# Patient Record
Sex: MALE | URBAN - METROPOLITAN AREA
[De-identification: names, ages, dates, MRNs, and addresses within clinical notes are randomized per-mention and may not be internally consistent; named-entity substitution may affect disease eponyms.]

---

## 2024-01-03 ENCOUNTER — TELEPHONE ENCOUNTER (OUTPATIENT)
Dept: URBAN - METROPOLITAN AREA CLINIC 113 | Facility: CLINIC | Age: 59
End: 2024-01-03

## 2024-05-03 ENCOUNTER — OFFICE VISIT (OUTPATIENT)
Dept: URBAN - METROPOLITAN AREA CLINIC 113 | Facility: CLINIC | Age: 59
End: 2024-05-03

## 2024-06-14 ENCOUNTER — LAB OUTSIDE AN ENCOUNTER (OUTPATIENT)
Dept: URBAN - METROPOLITAN AREA CLINIC 113 | Facility: CLINIC | Age: 59
End: 2024-06-14

## 2024-06-14 ENCOUNTER — DASHBOARD ENCOUNTERS (OUTPATIENT)
Age: 59
End: 2024-06-14

## 2024-06-14 ENCOUNTER — OFFICE VISIT (OUTPATIENT)
Dept: URBAN - METROPOLITAN AREA CLINIC 113 | Facility: CLINIC | Age: 59
End: 2024-06-14
Payer: MEDICARE

## 2024-06-14 VITALS
HEART RATE: 68 BPM | WEIGHT: 250 LBS | BODY MASS INDEX: 35 KG/M2 | TEMPERATURE: 97.9 F | RESPIRATION RATE: 18 BRPM | HEIGHT: 71 IN | DIASTOLIC BLOOD PRESSURE: 78 MMHG | SYSTOLIC BLOOD PRESSURE: 132 MMHG

## 2024-06-14 DIAGNOSIS — Z12.11 COLON CANCER SCREENING: ICD-10-CM

## 2024-06-14 DIAGNOSIS — K76.0 FATTY LIVER: ICD-10-CM

## 2024-06-14 DIAGNOSIS — R74.8 ELEVATED LIPASE: ICD-10-CM

## 2024-06-14 PROBLEM — 197321007: Status: ACTIVE | Noted: 2024-06-14

## 2024-06-14 PROBLEM — 441957004: Status: ACTIVE | Noted: 2024-06-14

## 2024-06-14 PROBLEM — 305058001: Status: ACTIVE | Noted: 2024-06-14

## 2024-06-14 PROCEDURE — 99204 OFFICE O/P NEW MOD 45 MIN: CPT | Performed by: INTERNAL MEDICINE

## 2024-06-14 RX ORDER — ATORVASTATIN CALCIUM 40 MG/1
1 TABLET TABLET, FILM COATED ORAL ONCE A DAY
Status: ACTIVE | COMMUNITY

## 2024-06-14 RX ORDER — LEVOTHYROXINE SODIUM 75 UG/1
1 CAPSULE IN THE MORNING ON AN EMPTY STOMACH CAPSULE ORAL ONCE A DAY
Status: ACTIVE | COMMUNITY

## 2024-06-14 RX ORDER — NORTRIPTYLINE HYDROCHLORIDE 50 MG/1
1 CAPSULE CAPSULE ORAL ONCE A DAY
Status: ACTIVE | COMMUNITY

## 2024-06-14 NOTE — HPI-TODAY'S VISIT:
58-year-old man referred by Dr. Tessa Garsia for evaluation of acute pancreatitis.  Referral was sent in December 2023. Patient has a history of obesity, depression, chronic pain, CVA, COPD.  Patient reportedly smokes marijuana. Patient presented in December 2023 to Dr. Garsia with nausea, LUQ pain, and reported weight loss.  H. pylori was negative.  Lipase was mildly elevated to 83.  BUN 14, creatinine 0.9, total bilirubin 0.6, ALT 51, AST 31, alkaline phosphatase 80 Patient was subsequently diagnosed with acute pancreatitis, presumably on the basis of mild elevation in lipase and was referred for further evaluation. Of note, patient was referred in June 2023 for colorectal cancer screening but was not scheduled. Patient had CT scan in Nov 2023.   Currently no abd pain, weight loss, has cut down on beer consumption (now down to 3/day), tries to adhere to low fat diet (has eggs, toast, grits for breakfast, vegetables, grilled meats and fish with vegetables for lunch/dinner). normal BM.  no blood in stool.  Occasional bowel irregularity.  Cologuard years ago.

## 2024-06-14 NOTE — PHYSICAL EXAM GASTROINTESTINAL
Abdomen softly distended, nontender, no masses palpable , normal bowel sounds, no hepatosplenomegaly

## 2024-06-15 LAB
A/G RATIO: 2
ABSOLUTE BASOPHILS: 30
ABSOLUTE EOSINOPHILS: 473
ABSOLUTE LYMPHOCYTES: 1530
ABSOLUTE MONOCYTES: 375
ABSOLUTE NEUTROPHILS: 5093
ALBUMIN: 4.3
ALKALINE PHOSPHATASE: 70
ALT (SGPT): 17
AST (SGOT): 15
BASOPHILS: 0.4
BILIRUBIN, TOTAL: 0.3
BUN/CREATININE RATIO: (no result)
BUN: 16
CALCIUM: 9.4
CARBON DIOXIDE, TOTAL: 26
CHLORIDE: 105
CREATININE: 0.78
EGFR: 103
EOSINOPHILS: 6.3
GLOBULIN, TOTAL: 2.2
GLUCOSE: 116
HEMATOCRIT: 45.5
HEMOGLOBIN: 14.4
INR: 1
LIPASE: 17
LYMPHOCYTES: 20.4
MCH: 29
MCHC: 31.6
MCV: 91.7
MONOCYTES: 5
MPV: 12.3
NEUTROPHILS: 67.9
PLATELET COUNT: 130
POTASSIUM: 4.2
PROTEIN, TOTAL: 6.5
PT: 10.3
RDW: 14.1
RED BLOOD CELL COUNT: 4.96
SODIUM: 139
WHITE BLOOD CELL COUNT: 7.5

## 2024-06-17 ENCOUNTER — TELEPHONE ENCOUNTER (OUTPATIENT)
Dept: URBAN - METROPOLITAN AREA CLINIC 113 | Facility: CLINIC | Age: 59
End: 2024-06-17

## 2024-07-23 ENCOUNTER — LAB OUTSIDE AN ENCOUNTER (OUTPATIENT)
Dept: URBAN - METROPOLITAN AREA CLINIC 113 | Facility: CLINIC | Age: 59
End: 2024-07-23

## 2024-07-23 ENCOUNTER — TELEPHONE ENCOUNTER (OUTPATIENT)
Dept: URBAN - METROPOLITAN AREA CLINIC 113 | Facility: CLINIC | Age: 59
End: 2024-07-23

## 2024-08-16 ENCOUNTER — TELEPHONE ENCOUNTER (OUTPATIENT)
Dept: URBAN - METROPOLITAN AREA CLINIC 113 | Facility: CLINIC | Age: 59
End: 2024-08-16

## 2024-10-04 ENCOUNTER — OFFICE VISIT (OUTPATIENT)
Dept: URBAN - METROPOLITAN AREA CLINIC 113 | Facility: CLINIC | Age: 59
End: 2024-10-04
Payer: MEDICARE

## 2024-10-04 VITALS
DIASTOLIC BLOOD PRESSURE: 76 MMHG | WEIGHT: 255 LBS | SYSTOLIC BLOOD PRESSURE: 130 MMHG | BODY MASS INDEX: 35.7 KG/M2 | HEIGHT: 71 IN | TEMPERATURE: 97.6 F | HEART RATE: 66 BPM

## 2024-10-04 DIAGNOSIS — Z12.11 COLON CANCER SCREENING: ICD-10-CM

## 2024-10-04 DIAGNOSIS — K76.0 FATTY LIVER: ICD-10-CM

## 2024-10-04 PROCEDURE — 99213 OFFICE O/P EST LOW 20 MIN: CPT | Performed by: INTERNAL MEDICINE

## 2024-10-04 RX ORDER — ATORVASTATIN CALCIUM 40 MG/1
1 TABLET TABLET, FILM COATED ORAL ONCE A DAY
Status: ACTIVE | COMMUNITY

## 2024-10-04 RX ORDER — PREGABALIN 300 MG/1
1 CAPSULE CAPSULE ORAL TWICE A DAY
Status: ACTIVE | COMMUNITY

## 2024-10-04 RX ORDER — LEVOTHYROXINE SODIUM 75 UG/1
1 CAPSULE IN THE MORNING ON AN EMPTY STOMACH CAPSULE ORAL ONCE A DAY
Status: ACTIVE | COMMUNITY

## 2024-10-04 RX ORDER — NORTRIPTYLINE HYDROCHLORIDE 50 MG/1
1 CAPSULE CAPSULE ORAL ONCE A DAY
Status: ACTIVE | COMMUNITY

## 2024-10-04 RX ORDER — TIZANIDINE HYDROCHLORIDE 4 MG/1
1 TABLET AT BEDTIME AS NEEDED TABLET ORAL
Status: ACTIVE | COMMUNITY

## 2024-10-04 RX ORDER — EPINEPHRINE 0.15 MG/.15ML
AS DIRECTED INJECTION SUBCUTANEOUS
Status: ACTIVE | COMMUNITY

## 2024-10-04 NOTE — PHYSICAL EXAM GASTROINTESTINAL
Abdomen obese, soft, nontender, no masses palpable , normal bowel sounds, no palpable hepatosplenomegaly

## 2024-10-04 NOTE — HPI-TODAY'S VISIT:
58-year-old man with history of obesity, depression, chronic pain, CVA, COPD initially seen in the office on 6/14/2024 for further evaluation of suspected pancreatitis presents for follow-up. On review of the records, his lipase was mildly elevated to 83 and there is no radiographic evidence for pancreatitis.  In June, he was not having any abdominal pain.  He had cut down on his beer consumption and was trying to adhere to a low-fat diet.  He endorsed having a Cologuard years prior. My impression at that time was that he did not have pancreatitis as the mild lipase elevation did not meet criteria and he did not have radiographic evidence for pancreatitis.  Advised review of CT scan, colonoscopy, and ongoing efforts at diet, exercise, weight loss, and decreased alcohol consumption. In the interim, he underwent an abdominal ultrasound with elastography of the liver which revealed a 5.6 x 3.3 x 9.2 cm left hepatic hypoechoic mass.  Hepatic steatosis and borderline hepatomegaly.  His median liver elastography was 5.56 kPa which is suggestive of normal to mild liver fibrosis.  Given the concern for a liver mass, he underwent MRI with and without contrast on 8/14/2024 which revealed no suspicious mass.  The findings likely were due to focal fatty sparing which is resolved in the interim.  Nonspecific splenomegaly.  Hepatic steatosis and hepatomegaly. He has not had a colonoscopy. No abd pain.  No n/v.  trying to eat more healthy.  continues to drink 3-4 beers per day.